# Patient Record
(demographics unavailable — no encounter records)

---

## 2025-05-28 NOTE — PAST MEDICAL HISTORY
[Postmenopausal] : The patient is postmenopausal [Menarche Age ____] : age at menarche was [unfilled] [Menopause Age____] : age at menopause was [unfilled] [History of Hormone Replacement Treatment] : has no history of hormone replacement treatment [Approximately ___] : the LMP was approximately [unfilled] Other (specify) [Total Preg ___] : G[unfilled] [Live Births ___] : P[unfilled]  [Age At Live Birth ___] : Age at live birth: [unfilled]

## 2025-05-28 NOTE — REASON FOR VISIT
[Initial Eval - Existing Diagnosis] : an initial evaluation of an existing diagnosis [FreeTextEntry1] : The patient comes in with a family history of breast cancer and a personal history of a right breast cancer for which she underwent a right breast partial mastectomy and sentinel lymph node biopsy in June 2012.  Final pathology showed a 1 cm infiltrating ductal cancer which was poorly differentiated and was ER/KS negative HER-2/dion positive by IHC and FISH.  She had 1 sentinel lymph node with a micrometastasis measuring 0.7 mm and 3 other negative sentinel lymph nodes (current pathologic prognostic stage IA).  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  She underwent AC followed by TH by Dr. Reyes in [place] and underwent external beam radiation.  Genetic testing was negative.  She comes in now to reestablish routine breast cancer screening/surveillance.

## 2025-05-28 NOTE — ASSESSMENT
[FreeTextEntry1] : The patient is a 59-year-old  postmenopausal white female of [place] descent.  She underwent menarche at age 11 and had her first child at age 28.  She underwent a right breast biopsy in  which was benign.  She has a family history with her paternal cousin who had breast cancer in her early 50s.  She has no family history of ovarian cancer.  She was found to have distortion and calcifications in the upper outer aspect of the right breast in  and was found to have cancer and underwent a right breast partial mastectomy and sentinel lymph node biopsy on 2012.  Final pathology showed a 1 cm infiltrating ductal cancer which was ER/VT negative HER-2/dion positive by IHC and FISH and she had 1 sentinel lymph node with a micrometastatic focus measuring 0.7 mm and 3 other sentinel lymph nodes were negative.  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  MammaPrint showed a high risk HER-2 overexpressing tumor and she underwent chemotherapy with AC followed by TH by Dr. Reyes in [place].  She had a pathologic prognostic stage IA breast cancer which was T1b, N1 microscopic, M0.  She underwent external beam radiation in [place].  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  On exam today, she has scarring changes in the upper outer aspect of the right breast but no evidence of recurrence.  She underwent her last bilateral mammography and ultrasound which was reviewed from ??????? 2021 performed at ???????? which showed ???????.  She should follow-up again in 1 year and continue with yearly mammography and ultrasound.  Of note is [place]  I did give her Dr. Reeves's name for a medical oncologist to follow-up with ??????.

## 2025-05-28 NOTE — PHYSICAL EXAM
[Normocephalic] : normocephalic [Atraumatic] : atraumatic [EOMI] : extra ocular movement intact [Supple] : supple [No Supraclavicular Adenopathy] : no supraclavicular adenopathy [No Cervical Adenopathy] : no cervical adenopathy [Examined in the supine and seated position] : examined in the supine and seated position [No dominant masses] : no dominant masses in right breast  [No dominant masses] : no dominant masses left breast [No Nipple Retraction] : no left nipple retraction [No Nipple Discharge] : no left nipple discharge [Breast Mass Right Breast ___cm] : no masses [Breast Mass Left Breast ___cm] : no masses [Breast Nipple Inversion] : nipples not inverted [Breast Nipple Retraction] : nipples not retracted [Breast Nipple Flattening] : nipples not flattened [Breast Nipple Fissures] : nipples not fissured [Breast Abnormal Lactation (Galactorrhea)] : no galactorrhea [Breast Abnormal Secretion Bloody Fluid] : no bloody discharge [Breast Abnormal Secretion Serous Fluid] : no serous discharge [Breast Abnormal Secretion Opalescent Fluid] : no milky discharge [No Axillary Lymphadenopathy] : no left axillary lymphadenopathy [No Edema] : no edema [No Rashes] : no rashes [No Ulceration] : no ulceration [de-identified] : On exam, the patient has ptotic D-cup breasts.  She has a well-healed incision in the upper outer aspect of the right breast.  I cannot feel any evidence of recurrence.  She has no axillary, supraclavicular, or cervical adenopathy. [de-identified] : Status post right breast partial mastectomy with scarring changes in the upper outer quadrant but no evidence of recurrence.

## 2025-05-28 NOTE — HISTORY OF PRESENT ILLNESS
[FreeTextEntry1] : The patient is a 59-year-old  postmenopausal white female of [place] descent.  She underwent menarche at age 11 and had her first child at age 28.  She underwent a right breast biopsy in  which was benign.  She has a family history with her paternal cousin who had breast cancer in her early 50s.  She has no family history of ovarian cancer.  She was found to have distortion and calcifications in the upper outer aspect of the right breast in  and was found to have cancer and underwent a right breast partial mastectomy and sentinel lymph node biopsy on 2012.  Final pathology showed a 1 cm infiltrating ductal cancer which was ER/FL negative HER-2/dion positive by IHC and FISH and she had 1 sentinel lymph node with a micrometastatic focus measuring 0.7 mm and 3 other sentinel lymph nodes were negative.  She also had DCIS which was ER/FL negative.  MammaPrint showed a high risk HER-2 overexpressing tumor and she underwent chemotherapy with AC followed by TH by Dr. Reyes in [place].  She had a pathologic prognostic stage IA breast cancer which was T1b, N1 microscopic, M0.  She underwent external beam radiation in [place].  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  The patient currently lives in [place].  She comes in now to reestablish routine breast cancer screening/surveillance and she continues to get yearly mammography and ultrasound.

## 2025-06-25 NOTE — ADDENDUM
[FreeTextEntry1] : I spent greater than 75% of the consultation in face-to-face counseling and coordination of care in this patient with a history of a right breast HER2 positive breast cancer who underwent a partial mastectomy and HER2 directed chemotherapy and comes in now to reestablish routine breast cancer screening/surveillance.

## 2025-06-25 NOTE — PHYSICAL EXAM
[Normocephalic] : normocephalic [Atraumatic] : atraumatic [EOMI] : extra ocular movement intact [Supple] : supple [No Supraclavicular Adenopathy] : no supraclavicular adenopathy [No Cervical Adenopathy] : no cervical adenopathy [Examined in the supine and seated position] : examined in the supine and seated position [No dominant masses] : no dominant masses in right breast  [No dominant masses] : no dominant masses left breast [No Nipple Retraction] : no left nipple retraction [No Nipple Discharge] : no left nipple discharge [Breast Mass Right Breast ___cm] : no masses [Breast Mass Left Breast ___cm] : no masses [No Axillary Lymphadenopathy] : no left axillary lymphadenopathy [No Edema] : no edema [No Rashes] : no rashes [No Ulceration] : no ulceration [Breast Nipple Inversion] : nipples not inverted [Breast Nipple Retraction] : nipples not retracted [Breast Nipple Flattening] : nipples not flattened [Breast Nipple Fissures] : nipples not fissured [Breast Abnormal Lactation (Galactorrhea)] : no galactorrhea [Breast Abnormal Secretion Bloody Fluid] : no bloody discharge [Breast Abnormal Secretion Serous Fluid] : no serous discharge [Breast Abnormal Secretion Opalescent Fluid] : no milky discharge [de-identified] : On exam, the patient has ptotic D-cup breasts.  She has a well-healed incision in the upper outer aspect of the right breast with some chronic unchanged scarring changes.  I cannot feel any evidence of recurrence.  She has no axillary, supraclavicular, or cervical adenopathy. [de-identified] : Status post right breast partial mastectomy with scarring changes in the upper outer quadrant but no evidence of recurrence.

## 2025-06-25 NOTE — REASON FOR VISIT
[Initial Eval - Existing Diagnosis] : an initial evaluation of an existing diagnosis [FreeTextEntry1] : The patient comes in with a family history of breast cancer and a personal history of a right breast cancer for which she underwent a right breast partial mastectomy and sentinel lymph node biopsy in June 2012.  Final pathology showed a 1 cm infiltrating ductal cancer which was poorly differentiated and was ER/KS negative HER-2/dion positive by IHC and FISH.  She had 1 sentinel lymph node with a micrometastasis measuring 0.7 mm and 3 other negative sentinel lymph nodes (current pathologic prognostic stage IA).  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  She underwent AC followed by TH by Dr. Reyes in [place] and underwent external beam radiation.  Genetic testing was negative.  She comes in now to reestablish routine breast cancer screening/surveillance.

## 2025-06-25 NOTE — PAST MEDICAL HISTORY
[Postmenopausal] : The patient is postmenopausal [Menarche Age ____] : age at menarche was [unfilled] [Menopause Age____] : age at menopause was [unfilled] [Approximately ___] : the LMP was approximately [unfilled] [Total Preg ___] : G[unfilled] [Live Births ___] : P[unfilled]  [Age At Live Birth ___] : Age at live birth: [unfilled] [History of Hormone Replacement Treatment] : has no history of hormone replacement treatment

## 2025-06-25 NOTE — HISTORY OF PRESENT ILLNESS
[FreeTextEntry1] : The patient is a 59-year-old  postmenopausal white female of [place] descent.  She underwent menarche at age 11 and had her first child at age 28.  She underwent a right breast biopsy in  which was benign.  She has a family history with her paternal cousin who had breast cancer in her early 50s.  She has no family history of ovarian cancer.  She was found to have distortion and calcifications in the upper outer aspect of the right breast in  and was found to have cancer and underwent a right breast partial mastectomy and sentinel lymph node biopsy on 2012.  Final pathology showed a 1 cm infiltrating ductal cancer which was ER/HI negative HER-2/dion positive by IHC and FISH and she had 1 sentinel lymph node with a micrometastatic focus measuring 0.7 mm and 3 other sentinel lymph nodes were negative.  She also had DCIS which was ER/HI negative.  MammaPrint showed a high risk HER-2 overexpressing tumor and she underwent chemotherapy with AC followed by TH by Dr. Reyes in [place].  She had a pathologic prognostic stage IA breast cancer which was T1b, N1 microscopic, M0.  She underwent external beam radiation in [place].  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  The patient currently lives in [place].  She comes in now to reestablish routine breast cancer screening/surveillance and she continues to get yearly mammography and ultrasound.

## 2025-06-25 NOTE — REVIEW OF SYSTEMS
[Negative] : Heme/Lymph [Eyesight Problems] : eyesight problems [Palpitations] : palpitations [Heartburn] : heartburn

## 2025-06-25 NOTE — ASSESSMENT
[FreeTextEntry1] : The patient is a 59-year-old  postmenopausal white female of [place] descent.  She underwent menarche at age 11 and had her first child at age 28.  She underwent a right breast biopsy in  which was benign.  She has a family history with her paternal cousin who had breast cancer in her early 50s.  She has no family history of ovarian cancer.  She was found to have distortion and calcifications in the upper outer aspect of the right breast in  and was found to have cancer and underwent a right breast partial mastectomy and sentinel lymph node biopsy on 2012.  Final pathology showed a 1 cm infiltrating ductal cancer which was ER/NM negative HER-2/dion positive by IHC and FISH and she had 1 sentinel lymph node with a micrometastatic focus measuring 0.7 mm and 3 other sentinel lymph nodes were negative.  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  MammaPrint showed a high risk HER-2 overexpressing tumor and she underwent chemotherapy with AC followed by TH by Dr. Reyes in [place].  She had a pathologic prognostic stage IA breast cancer which was T1b, N1 microscopic, M0.  She underwent external beam radiation in [place].  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  On exam today, she has chronic scarring changes in the upper outer aspect of the right breast but no evidence of recurrence.  Her right breast is noticeably smaller than the left breast.  She underwent her last bilateral mammography and ultrasound which was reviewed from 2024 performed at [facility] in [place], [place] as part of the [place] [facility] which showed postsurgical changes in the right breast but no suspicious findings.  She should follow-up again in 1 year and continue with yearly mammography and ultrasound.  Of note is the patient [place] and now lives in [place], [place].  She is now looking for a new medical oncologist closer to her home in [place] for routine medical oncology follow-up.  She will be due for her next bilateral mammography and ultrasound now and was given prescriptions and will try to get this done closer to her home in [place].